# Patient Record
Sex: MALE | Race: BLACK OR AFRICAN AMERICAN | NOT HISPANIC OR LATINO | URBAN - NONMETROPOLITAN AREA
[De-identification: names, ages, dates, MRNs, and addresses within clinical notes are randomized per-mention and may not be internally consistent; named-entity substitution may affect disease eponyms.]

---

## 2018-07-05 PROBLEM — Z96.1: Noted: 2018-07-05

## 2018-07-05 PROBLEM — H25.11: Noted: 2018-07-05

## 2018-07-05 PROBLEM — E11.3412: Noted: 2018-06-14

## 2018-07-05 PROBLEM — H25.811: Noted: 2020-06-22

## 2019-02-06 ENCOUNTER — IMPORTED ENCOUNTER (OUTPATIENT)
Dept: URBAN - NONMETROPOLITAN AREA CLINIC 1 | Facility: CLINIC | Age: 84
End: 2019-02-06

## 2019-02-06 PROCEDURE — 67028 INJECTION EYE DRUG: CPT

## 2019-02-06 PROCEDURE — 92134 CPTRZ OPH DX IMG PST SGM RTA: CPT

## 2019-02-06 NOTE — PATIENT DISCUSSION
Severe Diabetic Edema OS s/p SIRENA #12 OS (Triesence SIRENA(3) and ST Depo with PVH)- Recommend additional Intravitreal Avastin Injection. After discussing this with the patient since they were agreeable an Intravitreal Avastin Injection was administered and the patient was scheduled to return in four weeks for follow-up evaluation.- Recommend spacing out SIRENA to two month intervals. 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 -Explained the possible effects of poorly controlled diabetes and the damage that diabetes can cause to ocular health. -Patient to check HgbA1C.-Pt instructed to contact our office with any vision changes. - IVFA OD on 6/20/18 shows no leakage or accumulationof dye. - No treament indicated OD. Cataract OD-Not yet surgical. -Reviewed symptoms of advancing cataract growth such as glare and halos and decreased vision.-Continue to monitor for now. Pt will notify us if any new symptoms develop. Pseudophakia OS- Doing well no treatment currently indicated.; Dr's Notes: SIRENA #7 OS 7/5/18IVA #8 OS 8/1/18IVA #9 OS 9/5/18IVA # 10 OS 10/3/18IVA #11 OS 11/7/18IVA #12 OS 12/5/18IVA #13 OS 2/6/19

## 2019-04-03 ENCOUNTER — IMPORTED ENCOUNTER (OUTPATIENT)
Dept: URBAN - NONMETROPOLITAN AREA CLINIC 1 | Facility: CLINIC | Age: 84
End: 2019-04-03

## 2019-04-03 PROCEDURE — 92134 CPTRZ OPH DX IMG PST SGM RTA: CPT

## 2019-04-03 PROCEDURE — 67028 INJECTION EYE DRUG: CPT

## 2019-04-03 NOTE — PATIENT DISCUSSION
Severe Diabetic Edema OS s/p SIRENA #13 OS (Triesence SIRENA(3) and ST Depo with PVH)- Recommend additional Intravitreal Avastin Injection. After discussing this with the patient since they were agreeable an Intravitreal Avastin Injection was administered and the patient was scheduled to return in four weeks for follow-up evaluation.- Recommend SIRENA's in 8 week intervals due to ineffectiveness of treatment goal is to stall progression. IDDM s  OD-Stressed the importance of keeping blood sugars under control blood pressure under control and weight normalization and regular visits with PCP. -Explained the possible effects of poorly controlled diabetes and the damage that diabetes can cause to ocular health. -Patient to check HgbA1C.-Pt instructed to contact our office with any vision changes. - IVFA OD on 6/20/18 shows no leakage or accumulationof dye. - No treament indicated OD. Cataract OD-Not yet surgical. -Reviewed symptoms of advancing cataract growth such as glare and halos and decreased vision.-Continue to monitor for now. Pt will notify us if any new symptoms develop. Pseudophakia OS- Doing well no treatment currently indicated.; Dr's Notes: SIRENA #7 OS 7/5/18IVA #8 OS 8/1/18IVA #9 OS 9/5/18IVA # 10 OS 10/3/18IVA #11 OS 11/7/18IVA #12 OS 12/5/18IVA #13 OS 2/6/19IVA #14 OS 4/3/19

## 2019-06-05 ENCOUNTER — IMPORTED ENCOUNTER (OUTPATIENT)
Dept: URBAN - NONMETROPOLITAN AREA CLINIC 1 | Facility: CLINIC | Age: 84
End: 2019-06-05

## 2019-06-05 PROCEDURE — 92250 FUNDUS PHOTOGRAPHY W/I&R: CPT

## 2019-06-05 PROCEDURE — 92235 FLUORESCEIN ANGRPH MLTIFRAME: CPT

## 2019-06-05 NOTE — PATIENT DISCUSSION
Severe Diabetic Edema OS s/p SIRENA #14 OS (Triesence SIRENA(3) and ST Depo with PVH)- Recommend additional Intravitreal Avastin Injection. After discussing this with the patient since they were agreeable an Intravitreal Avastin Injection was administered and the patient was scheduled to return in four weeks for follow-up evaluation.- Recommend SIRENA's in 8 week intervals due to ineffectiveness of treatment goal is to stall progression. IDDM s  OD-Stressed the importance of keeping blood sugars under control blood pressure under control and weight normalization and regular visits with PCP. -Explained the possible effects of poorly controlled diabetes and the damage that diabetes can cause to ocular health. -Patient to check HgbA1C.-Pt instructed to contact our office with any vision changes. - IVFA OD on 6/20/18 shows no leakage or accumulationof dye. - No treament indicated OD. Cataract OD-Not yet surgical. -Reviewed symptoms of advancing cataract growth such as glare and halos and decreased vision.-Continue to monitor for now. Pt will notify us if any new symptoms develop. Pseudophakia OS- Doing well no treatment currently indicated.; Dr's Notes: SIRENA #7 OS 7/5/18IVA #8 OS 8/1/18IVA #9 OS 9/5/18IVA # 10 OS 10/3/18IVA #11 OS 11/7/18IVA #12 OS 12/5/18IVA #13 OS 2/6/19IVA #14 OS 4/3/19IVA #15 OS 6/4/19

## 2019-08-07 ENCOUNTER — IMPORTED ENCOUNTER (OUTPATIENT)
Dept: URBAN - NONMETROPOLITAN AREA CLINIC 1 | Facility: CLINIC | Age: 84
End: 2019-08-07

## 2019-08-07 PROCEDURE — 67028 INJECTION EYE DRUG: CPT

## 2019-08-07 PROCEDURE — 92134 CPTRZ OPH DX IMG PST SGM RTA: CPT

## 2019-08-07 NOTE — PATIENT DISCUSSION
Severe Diabetic Edema OS s/p SIRENA #15 OS (Triesence SIRENA(3) and ST Depo with PVH)- Recommend additional Intravitreal Avastin Injection. After discussing this with the patient since they were agreeable an Intravitreal Avastin Injection was administered and the patient was scheduled to return in three months for follow-up evaluation. ***Recommend SIRENA's in 3 month intervals due to ineffectiveness of treatment goal is to stall progression. 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 -Explained the possible effects of poorly controlled diabetes and the damage that diabetes can cause to ocular health. -Patient to check HgbA1C.-Pt instructed to contact our office with any vision changes. - IVFA OD on 6/20/18 shows no leakage or accumulationof dye. - No treament indicated OD. Cataract OD-Not yet surgical.  Pt expressed concern to delay CE/IOL OD until DMVlicense becomes an issue.-Reviewed symptoms of advancing cataract growth such as glare and halos and decreased vision.-Continue to monitor for now. Pt will notify us if any new symptoms develop. Pseudophakia OS- Doing well no treatment currently indicated.; Dr's Notes: Post CE/IOL OS with retained lens:s/p Vtx PVH tried steroids and avastin for CME with no helpInjections Avastin every 2-3 months to control CMEIVA #7 OS 7/5/18IVA #8 OS 8/1/18IVA #9 OS 9/5/18IVA # 10 OS 10/3/18IVA #11 OS 11/7/18IVA #12 OS 12/5/18IVA #13 OS 2/6/19IVA #14 OS 4/3/19IVA #15 OS 6/5/19IVA #16 OS 8/7/19

## 2019-11-06 ENCOUNTER — IMPORTED ENCOUNTER (OUTPATIENT)
Dept: URBAN - NONMETROPOLITAN AREA CLINIC 1 | Facility: CLINIC | Age: 84
End: 2019-11-06

## 2019-11-06 PROCEDURE — 92134 CPTRZ OPH DX IMG PST SGM RTA: CPT

## 2019-11-06 PROCEDURE — 67028 INJECTION EYE DRUG: CPT

## 2019-11-06 NOTE — PATIENT DISCUSSION
Severe Diabetic Edema OS s/p SIRENA #16 OS (Triesence SIRENA(3) and ST Depo with PVH)- Recommend additional Intravitreal Avastin Injection. After discussing this with the patient since they were agreeable an Intravitreal Avastin Injection was administered and the patient was scheduled to return in three months for follow-up evaluation. ***Recommend SIRENA's in 3 month intervals due to ineffectiveness of treatment goal is to stall progression. 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 -Explained the possible effects of poorly controlled diabetes and the damage that diabetes can cause to ocular health. -Patient to check HgbA1C.-Pt instructed to contact our office with any vision changes. - IVFA OD on 6/20/18 shows no leakage or accumulationof dye. - No treament indicated OD. Cataract OD-Not yet surgical.  Pt expressed concern to delay CE/IOL OD until DMVlicense becomes an issue.-Reviewed symptoms of advancing cataract growth such as glare and halos and decreased vision.-Continue to monitor for now. Pt will notify us if any new symptoms develop. Pseudophakia OS- Doing well no treatment currently indicated.; Dr's Notes: Post CE/IOL OS with retained lens:s/p Vtx PVH tried steroids and avastin for CME with no helpInjections Avastin every 2-3 months to control CMEIVA #7 OS 7/5/18IVA #8 OS 8/1/18IVA #9 OS 9/5/18IVA # 10 OS 10/3/18IVA #11 OS 11/7/18IVA #12 OS 12/5/18IVA #13 OS 2/6/19IVA #14 OS 4/3/19IVA #15 OS 6/5/19IVA #16 OS 8/7/19IVA #17 OS 11/6/19***Recommend SIRENA's in 3 month intervals due to ineffectiveness of treatment goal is to stall progression.

## 2020-02-05 ENCOUNTER — IMPORTED ENCOUNTER (OUTPATIENT)
Dept: URBAN - NONMETROPOLITAN AREA CLINIC 1 | Facility: CLINIC | Age: 85
End: 2020-02-05

## 2020-02-05 PROCEDURE — 92134 CPTRZ OPH DX IMG PST SGM RTA: CPT

## 2020-02-05 PROCEDURE — 67028 INJECTION EYE DRUG: CPT

## 2020-02-05 NOTE — PATIENT DISCUSSION
Severe Diabetic Edema OS s/p SIRENA #17 OS (Triesence SIRENA(3) and ST Depo with PVH)- Recommend additional Intravitreal Avastin Injection. After discussing this with the patient since they were agreeable an Intravitreal Avastin Injection was administered and the patient was scheduled to return in three months for follow-up evaluation. ***Recommend SIRENA's in 3 month intervals due to ineffectiveness of treatment goal is to stall progression. 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 -Explained the possible effects of poorly controlled diabetes and the damage that diabetes can cause to ocular health. -Patient to check HgbA1C.-Pt instructed to contact our office with any vision changes. - IVFA OD on 6/20/18 shows no leakage or accumulationof dye. - No treament indicated OD. Cataract OD****in May 2020 evaluate cataracts due to [facility] license expiring in [address] [place] [address],[address] yet surgical.  Pt expressed concern to delay CE/IOL OD until DMVlicense becomes an issue.-Reviewed symptoms of advancing cataract growth such as glare and halos and decreased vision.-Continue to monitor for now. Pt will notify us if any new symptoms develop. Pseudophakia OS- Doing well no treatment currently indicated.; Dr's Notes: Post CE/IOL OS with retained lens:s/p Vtx PVH tried steroids and avastin for CME with no helpInjections Avastin every 2-3 months to control CMEIVA #7 OS 7/5/18IVA #8 OS 8/1/18IVA #9 OS 9/5/18IVA # 10 OS 10/3/18IVA #11 OS 11/7/18IVA #12 OS 12/5/18IVA #13 OS 2/6/19IVA #14 OS 4/3/19IVA #15 OS 6/5/19IVA #16 OS 8/7/19IVA #17 OS 11/6/19IVA #18 OS 2/5/20***Recommend SIRENA's in 3 month intervals due to ineffectiveness of treatment goal is to stall progression.

## 2020-06-22 ENCOUNTER — IMPORTED ENCOUNTER (OUTPATIENT)
Dept: URBAN - NONMETROPOLITAN AREA CLINIC 1 | Facility: CLINIC | Age: 85
End: 2020-06-22

## 2020-06-22 PROCEDURE — 99213 OFFICE O/P EST LOW 20 MIN: CPT

## 2020-06-22 PROCEDURE — 92134 CPTRZ OPH DX IMG PST SGM RTA: CPT

## 2020-06-22 NOTE — PATIENT DISCUSSION
Severe Diabetic Edema OS s/p ISRENA #17 OS (Triesence SIRENA(3) and ST Depo with [facility])- Due to ineffectiveness of SIRENA's and only minimal change in OCT over the past 3 months recommend observation at this time. ***Recommend follow-up in 3 months. IDDM s  OD-Stressed the importance of keeping blood sugars under control blood pressure under control and weight normalization and regular visits with PCP. -Explained the possible effects of poorly controlled diabetes and the damage that diabetes can cause to ocular health. -Patient to check HgbA1C.-Pt instructed to contact our office with any vision changes. - IVFA OD on 6/20/18 shows no leakage or accumulationof dye. - No treament indicated OD. Cataract OD-Visually significant.-Cataract causing symptomatic impairment of visual function not correctable with a tolerable change in glasses or contact lenses lighting or non-operative means resulting in specific activity limitations and/or participation restrictions including but not limited to reading viewing television driving or meeting vocational or recreational needs. -Expectation is clearer vision and reduced glare disability after cataract removal.-Refer to Dr Louie Delaney for cataract evaluationPseudophakia OS- Doing well no treatment currently indicated.; Dr's Notes: Post CE/IOL OS with retained lens:s/p Vtx PVH tried steroids and avastin for CME with no helpInjections Avastin every 2-3 months to control CMEIVA #7 OS 7/5/18IVA #8 OS 8/1/18IVA #9 OS 9/5/18IVA # 10 OS 10/3/18IVA #11 OS 11/7/18IVA #12 OS 12/5/18IVA #13 OS 2/6/19IVA #14 OS 4/3/19IVA #15 OS 6/5/19IVA #16 OS 8/7/19IVA #17 OS 11/6/19IVA #18 OS 2/5/20***Recommend SIRENA's in 3 month intervals due to ineffectiveness of treatment goal is to stall progression.

## 2020-07-06 ENCOUNTER — IMPORTED ENCOUNTER (OUTPATIENT)
Dept: URBAN - NONMETROPOLITAN AREA CLINIC 1 | Facility: CLINIC | Age: 85
End: 2020-07-06

## 2020-07-06 PROBLEM — H25.811: Noted: 2020-07-06

## 2020-07-06 PROBLEM — Z01.818: Noted: 2020-07-29

## 2020-07-06 PROBLEM — E11.3412: Noted: 2020-07-06

## 2020-07-06 PROBLEM — I10: Noted: 2020-07-29

## 2020-07-06 PROBLEM — Z96.1: Noted: 2020-07-06

## 2020-07-06 PROCEDURE — 92014 COMPRE OPH EXAM EST PT 1/>: CPT

## 2020-07-06 NOTE — PATIENT DISCUSSION
Cataract-Visually significant cataract OD . -Cataract causing symptomatic impairment of visual function not correctable with a tolerable change in glasses or contact lenses lighting or non-operative means resulting in specific activity limitations and/or participation restrictions including but not limited to reading viewing television driving or meeting vocational or recreational needs. -Expectation is clearer vision and functional improvement in symptoms as well as reduced glare disability after cataract removal.-Order IOLMaster and OPD today. -Recommend Standard IOL based on today's OPD testing and lifestyle questionnaire.-All questions were answered regarding surgery including pre and post-op medications appointments activity restrictions and anesthetic usage.-The risks benefits and alternatives and special risk factors for the patient were discussed in detail including but not limited to: bleeding infection retinal detachment vitreous loss problems with the implant and possible need for additional surgery.-Although rare the possibility of complete vision loss was discussed.-The possible need for glasses post-operatively was discussed.-Order medical clearance exam based on history of NIDDM -Patient elects to proceed with cataract surgery OD . -Pseudophakia OS stable with no treatment needed at this time-Discussed all lens options in detail with patient. Elects Standard.-Discussed Diabetes and how this affected his vision OS s/p CE and he states he no longer has Diabetes and its currently controlled with no medication. Discussed with patient there will be beter view of his retina OD s/p CE and he stated understanding. The need for glasses was discussed. ----------- Notes below are from Dr. Ann Marie Salamanca he is being treated by him-------IDDM s DR OD-Stressed the importance of keeping blood sugars under control blood pressure under control and weight normalization and regular visits with PCP. -Explained the possible effects of poorly controlled diabetes and the damage that diabetes can cause to ocular health. -Patient to check HgbA1C.-Pt instructed to contact our office with any vision changes. - IVFA OD on 6/20/18 shows no leakage or accumulationof dye per Dr Ann Marie Salamanca. - No treament indicated OD. Severe Diabetic Edema OS s/p SIRENA #17 OS (Triesence SIRENA(3) and ST Depo with [address])- Due to ineffectiveness of SIRENA's and only minimal change in OCT over the past 3 months recommend observation at this time. ***Recommend follow-up in 3 months.; Dr's Notes: Post CE/IOL OS with retained lens:s/p Vtx PVH tried steroids and avastin for CME with no helpInjections Avastin every 2-3 months to control CMEIVA #7 OS 7/5/18IVA #8 OS 8/1/18IVA #9 OS 9/5/18IVA # 10 OS 10/3/18IVA #11 OS 11/7/18IVA #12 OS 12/5/18IVA #13 OS 2/6/19IVA #14 OS 4/3/19IVA #15 OS 6/5/19IVA #16 OS 8/7/19IVA #17 OS 11/6/19IVA #18 OS 2/5/20***Recommend SIRENA's in 3 month intervals due to ineffectiveness of treatment goal is to stall progression.

## 2020-07-08 ENCOUNTER — IMPORTED ENCOUNTER (OUTPATIENT)
Dept: URBAN - NONMETROPOLITAN AREA CLINIC 1 | Facility: CLINIC | Age: 85
End: 2020-07-08

## 2020-07-08 PROBLEM — H25.811: Noted: 2020-07-08

## 2020-07-08 PROBLEM — Z96.1: Noted: 2020-07-08

## 2020-07-08 PROBLEM — E11.3412: Noted: 2020-07-08

## 2020-07-29 ENCOUNTER — IMPORTED ENCOUNTER (OUTPATIENT)
Dept: URBAN - NONMETROPOLITAN AREA CLINIC 1 | Facility: CLINIC | Age: 85
End: 2020-07-29

## 2020-07-29 NOTE — PATIENT DISCUSSION
Medical Clearance-Medical clearance done today. -No outstanding concerns that would preclude surgery.-Patient is cleared to proceed with scheduled surgery.; Dr's Notes: Post CE/IOL OS with retained lens:s/p Vtx PVH tried steroids and avastin for CME with no helpInjections Avastin every 2-3 months to control CMEIVA #7 OS 7/5/18IVA #8 OS 8/1/18IVA #9 OS 9/5/18IVA # 10 OS 10/3/18IVA #11 OS 11/7/18IVA #12 OS 12/5/18IVA #13 OS 2/6/19IVA #14 OS 4/3/19IVA #15 OS 6/5/19IVA #16 OS 8/7/19IVA #17 OS 11/6/19IVA #18 OS 2/5/20***Recommend SIRENA's in 3 month intervals due to ineffectiveness of treatment goal is to stall progression.

## 2020-08-28 ENCOUNTER — IMPORTED ENCOUNTER (OUTPATIENT)
Dept: URBAN - NONMETROPOLITAN AREA CLINIC 1 | Facility: CLINIC | Age: 85
End: 2020-08-28

## 2020-08-28 PROBLEM — Z96.1: Noted: 2020-08-28

## 2020-08-28 PROBLEM — Z98.41: Noted: 2020-08-28

## 2020-08-28 PROBLEM — E11.3412: Noted: 2020-07-06

## 2020-08-28 PROCEDURE — 99024 POSTOP FOLLOW-UP VISIT: CPT

## 2020-08-28 PROCEDURE — 66984 XCAPSL CTRC RMVL W/O ECP: CPT

## 2020-08-28 PROCEDURE — 92136 OPHTHALMIC BIOMETRY: CPT

## 2020-08-28 NOTE — PATIENT DISCUSSION
Same day /p PCIOL OD-Pt doing well s/p PCIOL. -Continue post-op gtts according to instruction sheet and sleep with eye shield over eye for 7 nights.-Avoid bending at the waist lifting anything over 5lbs and dirty or kerri environments.; Dr's Notes: Post CE/IOL OS with retained lens:s/p Vtx PVH tried steroids and avastin for CME with no helpInjections Avastin every 2-3 months to control CMEIVA #7 OS 7/5/18IVA #8 OS 8/1/18IVA #9 OS 9/5/18IVA # 10 OS 10/3/18IVA #11 OS 11/7/18IVA #12 OS 12/5/18IVA #13 OS 2/6/19IVA #14 OS 4/3/19IVA #15 OS 6/5/19IVA #16 OS 8/7/19IVA #17 OS 11/6/19IVA #18 OS 2/5/20***Recommend SIRENA's in 3 month intervals due to ineffectiveness of treatment goal is to stall progression.

## 2020-09-04 ENCOUNTER — IMPORTED ENCOUNTER (OUTPATIENT)
Dept: URBAN - NONMETROPOLITAN AREA CLINIC 1 | Facility: CLINIC | Age: 85
End: 2020-09-04

## 2020-09-04 PROCEDURE — 99024 POSTOP FOLLOW-UP VISIT: CPT

## 2020-09-04 NOTE — PATIENT DISCUSSION
1 week s/p PCIOL OD-Pt doing well at 1 week s/p PCIOL. -Continue post-op gtts according to instruction sheet.-Okay to resume usual activites and d/c eye shield.; 's Notes: Post CE/IOL OS with retained lens:s/p Vtx PVH tried steroids and avastin for CME with no helpInjections Avastin every 2-3 months to control CMEIVA #7 OS 7/5/18IVA #8 OS 8/1/18IVA #9 OS 9/5/18IVA # 10 OS 10/3/18IVA #11 OS 11/7/18IVA #12 OS 12/5/18IVA #13 OS 2/6/19IVA #14 OS 4/3/19IVA #15 OS 6/5/19IVA #16 OS 8/7/19IVA #17 OS 11/6/19IVA #18 OS 2/5/20***Recommend SIRENA's in 3 month intervals due to ineffectiveness of treatment goal is to stall progression.

## 2021-02-11 ENCOUNTER — IMPORTED ENCOUNTER (OUTPATIENT)
Dept: URBAN - NONMETROPOLITAN AREA CLINIC 1 | Facility: CLINIC | Age: 86
End: 2021-02-11

## 2021-02-11 PROCEDURE — 67028 INJECTION EYE DRUG: CPT

## 2021-02-11 PROCEDURE — 92134 CPTRZ OPH DX IMG PST SGM RTA: CPT

## 2021-02-11 NOTE — PATIENT DISCUSSION
Pt had 5 [place] double lumen PICC placed in left brachial vein. Pt tolerated procedure well. 40 cm of catheter internal and 2 cm external. Awaiting tip confirmation by CXR. All lumens flush and draw well. Sterile dressing applied.     Severe NPDR OS s/p SIRENA #18 OS (Triesence SIRENA (3) and ST Depo with PVH) -Stressed the importance of keeping blood sugars under control blood pressure under control and weight normalization and regular visits with PCP. -Explained the possible effects of poorly controlled diabetes and the damage that diabetes can cause to ocular health. -Patient to check HgbA1C.-Pt instructed to contact our office with any vision changes.-Recommend additional Intravitreal Avastin Injection. After discussing this with the patient since they were agreeable an Intravitreal Avastin Injection was administered and the patient was scheduled to return in four weeks for follow- up evaluation. IDDM s  OD -Stressed the importance of keeping blood sugars under control blood pressure under control and weight normalization and regular visits with PCP. -Explained the possible effects of poorly controlled diabetes and the damage that diabetes can cause to ocular health. -Patient to check HgbA1C. -Pt instructed to contact our office with any vision changes. - IVFA OD on 6/20/18 shows no leakage or accumulationof dye. - No treament indicated OD. Subconjunctival hemorrhage OD- Located inferior temporal conjunctiva. No chemosis noted today. - Resolving- Recommend observationPseudophakia OU; Dr's Notes: Post CE/IOL OS with retained lens:s/p Vtx PVH tried steroids and avastin for CME with no helpInjections Avastin every 2-3 months to control CMEIVA #7 OS 7/5/18IVA #8 OS 8/1/18IVA #9 OS 9/5/18IVA # 10 OS 10/3/18IVA #11 OS 11/7/18IVA #12 OS 12/5/18IVA #13 OS 2/6/19IVA #14 OS 4/3/19IVA #15 OS 6/5/19IVA #16 OS 8/7/19IVA #17 OS 11/6/19IVA #18 OS 2/5/20***Recommend SIRENA's in 3 month intervals due to ineffectiveness of treatment goal is to stall progression.

## 2021-03-17 ENCOUNTER — IMPORTED ENCOUNTER (OUTPATIENT)
Dept: URBAN - NONMETROPOLITAN AREA CLINIC 1 | Facility: CLINIC | Age: 86
End: 2021-03-17

## 2021-03-17 PROBLEM — E11.3412: Noted: 2021-03-17

## 2021-03-17 PROBLEM — Z96.1: Noted: 2021-03-17

## 2021-03-17 PROCEDURE — 92134 CPTRZ OPH DX IMG PST SGM RTA: CPT

## 2021-03-17 PROCEDURE — 67028 INJECTION EYE DRUG: CPT

## 2021-03-17 NOTE — PATIENT DISCUSSION
NIDDM s  OD-Stressed the importance of keeping blood sugars under control blood pressure under control and weight normalization and regular visits with PCP. -Explained the possible effects of poorly controlled diabetes and the damage that diabetes can cause to ocular health. -Patient to check HgbA1C.-Pt instructed to contact our office with any vision changes. -OCT today OD shows normal foveal contour No SRF/Edema OD. NIDDM with edema OS-Stressed the importance of keeping blood sugars under control blood pressure under control and weight normalization and regular visits with PCP. -Explained the possible effects of poorly controlled diabetes and the damage that diabetes can cause to ocular health. -Patient to check HgbA1C.-Pt instructed to contact our office with any vision changes. -OCT today OS shows elevated fovea with large foveal cysts central macula OS. -Recommend additional Intravitreal Avastin Injection. After discussing this with the patient since they were agreeable an Intravitreal Avastin Injection was administered and the patient was scheduled to return in four weeks for follow- up evaluation.; Dr's Notes: Post CE/IOL OS with retained lens:s/p Vtx PVH tried steroids and avastin for CME with no helpInjections Avastin every 2-3 months to control Judi [place] #7 OS 7/5/18IVA #8 OS 8/1/18IVA #9 OS 9/5/18IVA # 10 OS 10/3/18IVA #11 OS 11/7/18IVA #12 OS 12/5/18IVA #13 OS 2/6/19IVA #14 OS 4/3/19IVA #15 OS 6/5/19IVA #16 OS 8/7/19IVA #17 OS 11/6/19IVA #18 OS 2/5/20IVA #19 OS 03/17/21***Recommend SIRENA's in 3 month intervals due to ineffectiveness of treatment goal is to stall progression.

## 2021-05-05 ENCOUNTER — IMPORTED ENCOUNTER (OUTPATIENT)
Dept: URBAN - NONMETROPOLITAN AREA CLINIC 1 | Facility: CLINIC | Age: 86
End: 2021-05-05

## 2021-05-05 PROCEDURE — 92134 CPTRZ OPH DX IMG PST SGM RTA: CPT

## 2021-05-05 PROCEDURE — 99213 OFFICE O/P EST LOW 20 MIN: CPT

## 2021-05-05 NOTE — PATIENT DISCUSSION
NIDDM s  OD-Stressed the importance of keeping blood sugars under control blood pressure under control and weight normalization and regular visits with PCP. -Explained the possible effects of poorly controlled diabetes and the damage that diabetes can cause to ocular health. -Patient to check HgbA1C.-Pt instructed to contact our office with any vision changes. -OCT today OD shows normal foveal contour No SRF/Edema OD. NIDDM with edema OS-Stressed the importance of keeping blood sugars under control blood pressure under control and weight normalization and regular visits with PCP. -Explained the possible effects of poorly controlled diabetes and the damage that diabetes can cause to ocular health. -Patient to check HgbA1C.-Pt instructed to contact our office with any vision changes. -OCT today OS shows elevated fovea with large foveal cysts central macula OS. -Recommend additional Intravitreal Avastin Injection. After discussing this with the patient they were agreeable. -When applying lid speculum OS a second subconjunctival hemorrhage was induced superior conjunctiva. Patient disclosed that he was recently prescribed a new blood thinner Xarelto. -Due to new subconjunctival hemorrhage induced today recommend defer SIRENA OS today. -Recommend follow up in 1 month.; Dr's Notes: Post CE/IOL OS with retained lens:s/p Vtx PVH tried steroids and avastin for CME with no helpInjections Avastin every 2-3 months to control CMEIVA #7 OS 7/5/18IVA #8 OS 8/1/18IVA #9 OS 9/5/18IVA # 10 OS 10/3/18IVA #11 OS 11/7/18IVA #12 OS 12/5/18IVA #13 OS 2/6/19IVA #14 OS 4/3/19IVA #15 OS 6/5/19IVA #16 OS 8/7/19IVA #17 OS 11/6/19IVA #18 OS 2/5/20IVA #19 OS 03/17/21***Recommend SIRENA's in 3 month intervals due to ineffectiveness of treatment goal is to stall progression.

## 2021-06-02 PROBLEM — E11.3412: Noted: 2021-06-02

## 2021-06-02 PROBLEM — H11.31: Noted: 2021-06-02

## 2021-06-02 PROBLEM — Z96.1: Noted: 2021-06-02

## 2021-09-29 ENCOUNTER — IMPORTED ENCOUNTER (OUTPATIENT)
Dept: URBAN - NONMETROPOLITAN AREA CLINIC 1 | Facility: CLINIC | Age: 86
End: 2021-09-29

## 2021-09-29 PROBLEM — H52.4: Noted: 2021-09-29

## 2021-09-29 PROBLEM — E11.3291: Noted: 2021-09-29

## 2021-09-29 PROBLEM — E11.3412: Noted: 2021-09-29

## 2021-09-29 PROBLEM — Z96.1: Noted: 2021-09-29

## 2021-09-29 PROCEDURE — S0621 ROUTINE OPHTHALMOLOGICAL EXA: HCPCS

## 2021-09-29 NOTE — PATIENT DISCUSSION
Presbyopia OUDiscussed refractive status in detail with patient. New glasses Rx given today but are optional.Continue to monitor. NIDDM with NPDR OUDiscussed diagnosis with patient. Discussed the risk of diabetic damage of the retina with potential vision loss and the importance of routine follow-up. Emphasized strict blood sugar control. Patient has history of injections OS by Dr. Azucena Jc. Recommend refer to retina specalist in [place]. Patient refuses any further treatment or to travel to [place]. Continue to monitor. RTC in 6 months with Mac OCT P/C IOL OUDiscussed diagnosis in detail with patient. Both intraocular implants in place and stable. Continue to monitor.; Dr's Notes: Post CE/IOL OS with retained lens:s/p Vtx PVH tried steroids and avastin for CME with no helpInjections Avastin every 2-3 months to control CMEIVA #7 OS 7/5/18IVA #8 OS 8/1/18IVA #9 OS 9/5/18IVA # 10 OS 10/3/18IVA #11 OS 11/7/18IVA #12 OS 12/5/18IVA #13 OS 2/6/19IVA #14 OS 4/3/19IVA #15 OS 6/5/19IVA #16 OS 8/7/19IVA #17 OS 11/6/19IVA #18 OS 2/5/20IVA #19 OS 03/17/21***Recommend SIRENA's in 3 month intervals due to ineffectiveness of treatment goal is to stall progression.

## 2022-04-09 ASSESSMENT — VISUAL ACUITY
OS_CC: 20/400
OD_PH: 20/40
OS_PH: 20/200
OS_CC: 20/200-
OD_CC: 20/40-
OS_PH: 20/200
OD_CC: 20/20-2
OD_PH: 20/25
OS_SC: 20/200+1
OD_SC: 20/50-2
OD_SC: 20/400
OS_SC: 20/70
OD_PAM: 20/25
OS_CC: 20/400
OS_PH: 20/100
OS_CC: 20/150
OD_PH: 20/30-2
OD_CC: 20/70
OD_PH: 20/40
OD_CC: 20/25-2
OS_CC: 20/200
OS_CC: CF4'
OD_PH: 20/70
OD_SC: 20/40-
OD_CC: 20/100
OS_PH: 20/100-1
OD_SC: 20/400
OD_CC: 20/30+
OD_GLARE: 20/70
OS_CC: 20/300
OD_PH: 20/70
OS_SC: 20/200-
OS_SC: 20/70
OD_PH: 20/40
OD_CC: 20/100
OD_GLARE: 20/70
OD_CC: 20/40-
OS_PH: 20/100
OD_PH: 20/30-2
OS_CC: 20/200
OS_CC: 20/300
OS_PH: 20/200
OS_PH: 20/100-
OS_PH: 20/100-1
OD_CC: 20/90-2
OD_CC: 20/30-2
OD_PAM: 20/25
OS_PH: 20/150
OD_CC: 20/70

## 2022-04-09 ASSESSMENT — TONOMETRY
OS_IOP_MMHG: 13
OD_IOP_MMHG: 10
OD_IOP_MMHG: 13
OS_IOP_MMHG: 16
OD_IOP_MMHG: 12
OS_IOP_MMHG: 14
OS_IOP_MMHG: 14
OD_IOP_MMHG: 13
OD_IOP_MMHG: 12
OS_IOP_MMHG: 12
OD_IOP_MMHG: 12
OS_IOP_MMHG: 18
OD_IOP_MMHG: 14
OS_IOP_MMHG: 14
OD_IOP_MMHG: 12
OS_IOP_MMHG: 13
OD_IOP_MMHG: 12
OD_IOP_MMHG: 14
OD_IOP_MMHG: 15
OD_IOP_MMHG: 16
OD_IOP_MMHG: 13
OS_IOP_MMHG: 12
OS_IOP_MMHG: 13
OS_IOP_MMHG: 12
OS_IOP_MMHG: 12